# Patient Record
(demographics unavailable — no encounter records)

---

## 2025-01-02 NOTE — HISTORY OF PRESENT ILLNESS
[FreeTextEntry1] : EUGENE LU returns to the office today. He is a 69-year-old man with a history of BPH with lower urinary tract symptoms. He first presented to the office in 2022 with a UTI and acute renal failure with a creatinine level around 4mg/dL. A large prostate was noted upon ultrasound, there was no hydronephrosis seen. He had not been in urinary retention.    He is on Tamsulosin and Finasteride and reports improvement. His stream and urinary urgency are both better.   PSA in November 2023 was 0.77ng/mL with 39% free fraction.   He is following with a nephrologists and reports that his kidney function is stable.

## 2025-01-02 NOTE — LETTER BODY
[Dear  ___] : Dear  [unfilled], [Courtesy Letter:] : I had the pleasure of seeing your patient, [unfilled], in my office today. [Please see my note below.] : Please see my note below. [Consult Closing:] : Thank you very much for allowing me to participate in the care of this patient.  If you have any questions, please do not hesitate to contact me. [FreeTextEntry2] : Jaz Calderon [address] [place] [address] [FreeTextEntry3] : Sincerely,      Yousuf Knox MD, FACS Director of Urology Services, [facility] Chief of Urology, [facility]  of Urology   [facility], [place] [id number] P: [phone] F: [phone] [facility]

## 2025-01-02 NOTE — LETTER BODY
[Dear  ___] : Dear  [unfilled], [Courtesy Letter:] : I had the pleasure of seeing your patient, [unfilled], in my office today. [Please see my note below.] : Please see my note below. [Consult Closing:] : Thank you very much for allowing me to participate in the care of this patient.  If you have any questions, please do not hesitate to contact me. [FreeTextEntry2] : Jaz Calderon [address] [place] [address] [FreeTextEntry3] : Sincerely,      Yousuf Knox MD, FACS Director of Urology Services, [facility] Chief of Urology, [facility]  of Urology   [facility], [place] [id number] P: [phone] F: [phone] [facility]

## 2025-01-02 NOTE — ASSESSMENT
[FreeTextEntry1] : The patient has done well with dual therapy for BPH and bladder outlet obstruction reporting resolution of bothersome urinary symptoms and previous ultrasound had shown resolution of hydronephrosis.  He has not had any additional issues of urinary tract infection or retention or catheterization requirement.  For now the plan will be to continue him on the dual therapy with finasteride and tamsulosin.  Description will renew today.  The patient last had a PSA level in 2023 which was normal.  I will repeat that today to confirm no changes of significance.  I will be in touch with him with that result once available.  I also plan to recheck the basic metabolic panel today to reevaluate his renal function and other electrolytes.  I will let him know those results as well once available.  Plan for follow-up will be annual unless there are any new events/issues that occur.